# Patient Record
Sex: FEMALE | Race: WHITE | Employment: FULL TIME | URBAN - METROPOLITAN AREA
[De-identification: names, ages, dates, MRNs, and addresses within clinical notes are randomized per-mention and may not be internally consistent; named-entity substitution may affect disease eponyms.]

---

## 2021-06-02 ENCOUNTER — HOSPITAL ENCOUNTER (OUTPATIENT)
Dept: PHYSICAL THERAPY | Age: 31
Discharge: HOME | End: 2021-06-02
Payer: COMMERCIAL

## 2021-06-02 PROCEDURE — 97110 THERAPEUTIC EXERCISES: CPT | Performed by: PHYSICAL THERAPIST

## 2021-06-02 PROCEDURE — 97161 PT EVAL LOW COMPLEX 20 MIN: CPT | Performed by: PHYSICAL THERAPIST

## 2021-06-02 NOTE — THERAPY EVALUATION
Amanda Willard  : 1990  Primary: [facility]*  Secondary:  Therapy Center at [address] [place] [address], [address], [facility]  [phone]   [phone]       OUTPATIENT PHYSICAL THERAPY:Initial Assessment 2021   ICD-10: Treatment Diagnosis: (M54.5) LBP; (M62.81) muscle weakness;  (R 26.2) Difficulty walking   Precautions/Allergies:   Patient has no known allergies. TREATMENT PLAN:  Effective Dates: 2021 TO 2021 (90 days). Frequency/Duration: 1-2 times a week for 90 Day(s)     MEDICAL/REFERRING DIAGNOSIS:  Pain in right hip [M25.551]  Pain in left hip [M25.552]  Encounter for supervision of normal pregnancy, unspecified, unspecified trimester [Z34.90]   DATE OF ONSET: ; exacerbated with pregnancy  REFERRING PHYSICIAN: Aretha Polk MD MD Orders: Evaluate and Treat  Return MD Appointment: none scheduled      INITIAL ASSESSMENT:  Ms. Brian Vaughn presents with central and L side LBP from mechanical dysfunction, including R anterior innominate rotation and L side lumbar rotational fault. She reports pain of 8/10 and scored a 28% disability rating on the modified oswestry. She reports she had pain prior to pregnancy, but it has worsened the further along she's gotten. She is 33 weeks pregnant today. Neutral alignment was achieved with muslce energy techniques (MET) today. Patient was educated on supine-sit transfers and car transfers on protecting her back. Pt will benefit from skilled PT to address the deficits listed below. PROBLEM LIST (Impacting functional limitations):  1. Decreased Strength  2. Decreased Transfer Abilities  3. Decreased Ambulation Ability/Technique  4. Increased Pain  5. Decreased Knowledge of Precautions  6. Decreased [place] with Home Exercise Program INTERVENTIONS PLANNED: (Treatment may consist of any combination of the following)  1. Bed Mobility  2. Cold  3. Heat  4. Home Exercise Program (HEP)  5.  Manual Therapy  6. Neuromuscular Re-education/Strengthening  7. Range of Motion (ROM)  8. Therapeutic Activites  9. Therapeutic Exercise/Strengthening  10. Kinesiotaping   11. Dry Needling   TREATMENT PLAN:  GOALS: (Goals have been discussed and agreed upon with patient.)  Short-Term Functional Goals: Time Frame: 4-6 weeks (7-14-21)  1. Pt will be compliant and independent with HEP, log rolling and demonstrating correct, erect posture without an increase in pain. 2.   Pt will improve Oswestry score to 12/50 to increase pt's overall functional mobility. 3.   Pt to subjectively report a decrease in pain to 6/10 @ worst to increase pt's sitting, standing and walking tolerance. 4.   Pt will improve Lumbar AROM to 50% throughout with manageable pain to increase pt's overall functional mobility. 5.   Pt will maintain neutral pelvic and lumbar alignment ~ 50% of the time, indicating improved core, pelvic floor and hip strength. Discharge Goals: Time Frame: 8-12 weeks (8-31-21)  1. Pt will improve Oswestry score to <10/50 to increase pt's overall functional mobility. 2.   Pt will subjectively report a decrease in pain to 5/10 @ worst to allow pt to ambulate with a normal gait pattern and increase their overall functional mobility. 3.   Pt will subjectively report a decrease in frequency (1-2x/night)  of waking due to back pain. 4.   Pt will maintain neutral pelvic and lumbar alignment ~ 75% of the time, indicating improved core and B hip strength to 3+/5.   5.   Pt will be discharged from PT to [facility]. OUTCOME MEASURE:   Tool Used: Modified Oswestry Low Back Pain Questionnaire  Score:  Initial: 14/50 =28% disability  Most Recent: X/50 (Date: -- )   Interpretation of Score: Each section is scored on a 0-5 scale, 5 representing the greatest disability. The scores of each section are added together for a total score of 50.         MEDICAL NECESSITY:   · Patient is expected to demonstrate progress in strength, range of motion, balance, coordination and functional technique to improve pelvic/lumbar alignment and posture to decrease pain and increase pt's overall functional mobility. .    Total Duration: 20 minutes initial assessment. PT Patient Time In/Time Out  Time In: 1035  Time Out: 1120    Rehabilitation Potential For Stated Goals: Good  Regarding [employer] therapy, I certify that the treatment plan above will be carried out by a therapist or under their direction. Thank you for this referral,  Ingrid Linda, PT     Referring Physician Signature: Eliz Hawkins MD _______________________________ Date _____________     PAIN/SUBJECTIVE:   Initial: Pain Intensity 1: 8  Pain Location 1: Back, Hip  Pain Orientation 1: Left, Right  Post Session:  7/10   HISTORY:   History of Injury/Illness (Reason for Referral):  Patient reports a hx of mechanical back pain prior to pregnancy. She states it has worsened as her pregnancy has progressed. She takes Tylenol to manage pain, and would like to have less pain and improved strength for delivery. Past Medical History/Comorbidities:   Ms. Beryle Spates  has no past medical history on file. Ms. Beryle Spates  has no past surgical history on file. Social History/Living Environment:     Patient lives in a 1 story home with her spouse. 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 Dominant Side:         RIGHT   Ambulatory/Rehab Services H2 Model Falls Risk Assessment   Risk Factors:       No Risk Factors Identified Ability to Rise from Chair:       (0)  Ability to rise in a single movement   Falls Prevention Plan:       No modifications necessary   Total: (5 or greater = High Risk): 0   ©2010 [facility]. All Rights Reserved. [facility] States Patent #[id number].  Federal Law prohibits the replication, distribution or use without written permission from [employer]   Current Medications:     No current outpatient medications on file. Date Last Reviewed:  6/2/2021     Number of Personal Factors/Comorbidities that affect the Plan of Care: 0: LOW COMPLEXITY   EXAMINATION:   Observation/Orthostatic Postural Assessment:          Patient stands with higher R iliac crest compared to the L. Patient with increased WB on L. Palpation:          Patient moderately to severely tender with palpation to B SI joints and B piriformis, L > R. Posture/Deformity:  Lumbar AROM: % of normal motion in standing    Lumbar spine Date:  6/2/21 Date:   Date:     Direction  Parameters Parameters Parameters   Flexion  25% w/ pain     Extension  20% w/ pain     Rotation  25% B w/ pain     Side bending  25% B w/ pain     Hip WFL B       Strength Date:  6/2/21 Date:   Date:     Core/LE 3-/5 Parameters Parameters   Hip Flex R:3/5  L:3/5     Hip Ext R:3-/5  L:3-/5     Hip ABD R:3-/5  L:3-/5     Hip ADD R:3+/5  L:3+/5     Knee Ext R:4/5  L:4/5     Knee Flex R:3+/5  L:3+/5       Myotomes: Normal and equal B throughout  Lower Abdominals (L1):  Iliopsoas (L2):  Quadriceps (L3):  Anterior Tibialis (L4):  Extensor Hallicus Longus (L5):  Gastrocnemius (S1):    Sensation: Normal and equal B throughout  Anterior Thigh (L3):  Medial Foot (L4):  Dorsal Foot (L5):  Lateral Foot (S1):    Special Test/Function:  Pelvic Obliquity: +; R anterior innominate rotation  Rotational Fault: +; L side lumbar rotational fault  Accessory Mobility: hypermobile throughout  SI Compression:+ painful       Body Structures Involved:  1. Nerves  2. Bones  3. Joints  4. Muscles  5. Ligaments Body Functions Affected:  1. Sensory/Pain  2. Neuromusculoskeletal  3. Movement Related Activities and Participation Affected:  1. Mobility  2. Self Care  3. Domestic Life  4. Interpersonal Interactions and Relationships  5.  Community, Social and [place]   Number of elements (examined above) that affect the Plan of Care: 1-2: LOW COMPLEXITY   CLINICAL PRESENTATION:   Presentation: Stable and uncomplicated: LOW COMPLEXITY   CLINICAL DECISION MAKING:   Use of outcome tool(s) and clinical judgement create a POC that gives a: Clear prediction of patient's progress: LOW COMPLEXITY

## 2021-06-02 NOTE — PROGRESS NOTES
Elaine Book  : 1990  [facility]: [facility]*  Secondary:  Therapy Center at [facility]  [address] , [address], [place] [address]  [phone]   [phone]      OUTPATIENT PHYSICAL THERAPY: Daily Treatment Note 2021  ICD-10: Treatment Diagnosis: (M54.5) LBP; (M62.81) muscle weakness;  (R 26.2) Difficulty walking   Precautions/Allergies:   Patient has no known allergies. TREATMENT PLAN:  Effective Dates: 2021 TO 2021 (90 days). Frequency/Duration: 1-2 times a week for 90 Day(s)     MEDICAL/REFERRING DIAGNOSIS:  Pain in right hip [M25.551]  Pain in left hip [M25.552]  Encounter for supervision of normal pregnancy, unspecified, unspecified trimester [Z34.90]   DATE OF ONSET: ; exacerbated with pregnancy  REFERRING PHYSICIAN: Keanu eCrna MD MD Orders: Evaluate and Treat  Return MD Appointment: none scheduled        Pre-treatment Symptoms/Complaints:  Patient is 8 months pregnant and c/o central and L sided back pain, weakness and immobility. Pain: Initial: Pain Intensity 1: 8  Pain Location 1: Back, Hip  Pain Orientation 1: Left, Right  Post Session:  7/10   Medications Last Reviewed:  2021    Updated Objective Findings:  See evaluation note from today and R anterior innominate rotation and L side lumbar rotational fault. TREATMENT:   THERAPEUTIC EXERCISE: (25 minutes):  Exercises per grid below to improve mobility and strength. Required moderate visual, verbal and tactile cues to promote proper body alignment, promote proper body posture, promote proper body mechanics and promote proper body breathing techniques. Progressed resistance, range, repetitions and complexity of movement as indicated.      Date:  21 Date:   Date:     Activity/Exercise Parameters Parameters Parameters   Muscle Energy Technique (MET) R HS/L quads x 8 reps x 2 rounds     LTR w/ OP to L 20 x 2 rounds     TAs 10x     TAs w/ add sq 10x     Bridging w/ TAs and add sq 10x      Hip ABD Lime TB ;10x     DKTC/Happy Baby Pose 2 x 2min                             MANUAL THERAPY: (0 minutes): Joint mobilization and Soft tissue mobilization was utilized and necessary because of the patient's restricted joint motion and restricted motion of soft tissue. [employer] Portal  Access Code: [email or web address]  URL: [email or web address]  Date: 06/02/2021  Prepared by: Jennifer Brittle    Exercises  Supine Transversus Abdominis Bracing with Pelvic Floor Contraction - 1 x daily - 7 x weekly - 10 reps - 3 sets  Supine Hip Adduction Isometric with Ball - 1 x daily - 7 x weekly - 10 reps - 3 sets  Supine Bridge with Mini Swiss Ball Between Knees - 1 x daily - 7 x weekly - 10 reps - 3 sets  Hooklying Clamshell with Resistance - 1 x daily - 7 x weekly - 10 reps - 3 sets  Supine Lower Trunk Rotation - 1 x daily - 7 x weekly - 10 reps - 3 sets  Hooklying Single Knee to Chest Stretch - 2 x daily - 7 x weekly - 10 reps - 1 sets    Treatment/Session Summary:    · Response to Treatment:  Patient with neutral alignment after MET. Patient did well with above exercises, therefore I added those to her HEP. [employer] · Communication/Consultation:  HEP 2x daily, log rolling, transfers, etc.   · Equipment provided today:  HEP w/ instruction sheet; Lime TB; ice  · Recommendations/Intent for next treatment session: Next visit will focus on advancements to more challenging core and B hip strengthening exercises after pelvic/lumbar assessment/correction.   · Variance to POC: None    Total Treatment Billable Duration:  25 min therex; see initial assessment   PT Patient Time In/Time Out  Time In: 1035  Time Out: 5215 Fidelina Pettit, PT    Future Appointments   Date Time Provider Shameka Espino   6/7/2021 10:30 AM Katlyn Salinas PT [facility]   6/10/2021 10:30 AM Katlyn Salinas PT [facility]   6/14/2021 10:30 AM Katlyn Salinas PT [facility]   6/16/2021  2:00 PM Yoseph Brewer, PT [facility]   6/21/2021 11:00 AM Nette Barba, PT [facility]   6/24/2021 11:00 AM Nette Barba, PT [facility]   6/28/2021 10:30 AM Yoseph Brewer, PT [facility]   7/1/2021 10:30 AM Dora Morrow, PT [facility]

## 2021-06-07 ENCOUNTER — HOSPITAL ENCOUNTER (OUTPATIENT)
Dept: PHYSICAL THERAPY | Age: 31
Discharge: HOME | End: 2021-06-07
Payer: COMMERCIAL

## 2021-06-07 PROCEDURE — 97110 THERAPEUTIC EXERCISES: CPT | Performed by: PHYSICAL THERAPIST

## 2021-06-07 PROCEDURE — 97140 MANUAL THERAPY 1/> REGIONS: CPT | Performed by: PHYSICAL THERAPIST

## 2021-06-07 NOTE — PROGRESS NOTES
Radha Jaci  : 1990  Primary: [facility]*  Secondary:  Therapy Center at [address] [place] [address], [address], [place] [address]  [phone]   [phone]      OUTPATIENT PHYSICAL THERAPY: Daily Treatment Note 2021  ICD-10: Treatment Diagnosis: (M54.5) LBP; (M62.81) muscle weakness;  (R 26.2) Difficulty walking   Precautions/Allergies:   Patient has no known allergies. TREATMENT PLAN:  Effective Dates: 2021 TO 2021 (90 days). Frequency/Duration: 1-2 times a week for 90 Day(s)     MEDICAL/REFERRING DIAGNOSIS:  Pain in right hip [M25.551]  Pain in left hip [M25.552]  Encounter for supervision of normal pregnancy, unspecified, unspecified trimester [Z34.90]   DATE OF ONSET: ; exacerbated with pregnancy  REFERRING PHYSICIAN: Mickey Johnson MD MD Orders: Evaluate and Treat  Return MD Appointment: none scheduled        Pre-treatment Symptoms/Complaints:  Patient reports soreness after last PT visit. She states she feels she's striking harder on her R side today when she walks. Pain: Initial: Pain Intensity 1: 7  Pain Location 1: Back, Hip  Pain Orientation 1: Left  Post Session:  5/10   Medications Last Reviewed:  2021    Updated Objective Findings:  L anterior innominate rotation and R side lumbar rotational fault. TREATMENT:   THERAPEUTIC EXERCISE: (35 minutes):  Exercises per grid below to improve mobility and strength. Required moderate visual, verbal and tactile cues to promote proper body alignment, promote proper body posture, promote proper body mechanics and promote proper body breathing techniques. Progressed resistance, range, repetitions and complexity of movement as indicated.      Date:  21 Date:  21 Date:     Activity/Exercise Parameters Parameters Parameters   Muscle Energy Technique (MET) R HS/L quads x 8 reps x 2 rounds L HS/ R quads x 8 reps x 2 rounds    LTR w/ OP to L 20 x 2 rounds To R; 20 x 2 rounds TAs 10x     TAs w/ add sq 10x 15x    Bridging w/ TAs and add sq 10x  10x    Hip ABD Lime TB ;10x Man Resist; 15x    DKTC/Happy Baby Pose 2 x 2min 3 x 2min                      Recumbent Stepper  L1; 10min    MANUAL THERAPY: (15 minutes): Joint mobilization and Soft tissue mobilization was utilized and necessary because of the patient's restricted joint motion and restricted motion of soft tissue. STM/MFR to B posterior hips, manually and with stick,  including trigger point release with elbow to piriformis and glute medius, L > R.     [employer] Portal  Access Code: [email or web address]  URL: [email or web address]  Date: 06/02/2021  Prepared by: Kimmy Glynn    Exercises  Supine Transversus Abdominis Bracing with Pelvic Floor Contraction - 1 x daily - 7 x weekly - 10 reps - 3 sets  Supine Hip Adduction Isometric with Ball - 1 x daily - 7 x weekly - 10 reps - 3 sets  Supine Bridge with Mini Swiss Ball Between Knees - 1 x daily - 7 x weekly - 10 reps - 3 sets  Hooklying Clamshell with Resistance - 1 x daily - 7 x weekly - 10 reps - 3 sets  Supine Lower Trunk Rotation - 1 x daily - 7 x weekly - 10 reps - 3 sets  Hooklying Single Knee to Chest Stretch - 2 x daily - 7 x weekly - 10 reps - 1 sets    Treatment/Session Summary:    · Response to Treatment:  Patient with neutral alignment after MET. She was able to tolerate trigger point release B. Improved posture and gait after treatment. .  · Communication/Consultation:  HEP 2x daily, log rolling, transfers, etc.   · Equipment provided today:   ice  · Recommendations/Intent for next treatment session: Next visit will focus on advancements to more challenging core and B hip strengthening exercises after pelvic/lumbar assessment/correction.   · Variance to POC: None    Total Treatment Billable Duration:  50 minutes ( 35 min therex; 15 min manual)  PT Patient Time In/Time Out  Time In: 1030  Time Out: 1675 Fidelina Pettit, PT    Future Appointments   Date Time Provider Shameka Kenzie   6/10/2021  7:00 PM Dulcie Morning, PT [facility]   6/14/2021 10:30 AM Dulcie Morning, PT [facility]   6/16/2021  7:00 PM Dulcie Morning, PT [facility]   6/21/2021 11:00 AM Brianna Barba, PT [facility]   6/24/2021  7:00 PM Skyler Barba, PT [facility]   6/28/2021 10:30 AM Dulcie Morning, PT [facility]   7/1/2021  7:00 PM Dulcie Morning, PT [facility]

## 2021-06-10 ENCOUNTER — HOSPITAL ENCOUNTER (OUTPATIENT)
Dept: PHYSICAL THERAPY | Age: 31
Discharge: HOME | End: 2021-06-10
Payer: COMMERCIAL

## 2021-06-10 NOTE — PROGRESS NOTES
Ady Kang  : 1990  Primary: [facility]*  Secondary:  [facility], [address], [place] [address]  [phone]   [phone]          OUTPATIENT DAILY NOTE    NAME/AGE/GENDER: Ady Kang is a 26 y.o. female. DATE: 6/10/2021    Ms. Stephany Mirza for today's appointment due to only being able to attend on  due to work schedule. Cheo Reynoso, PT      Future Appointments   Date Time Provider Shameka Espino   6/10/2021  7:00 PM Marcelina Prado, PT [facility]   2021 10:30 AM [facility] Pr[facility], [facility]   2021  7:00 PM Marcelina Prado, PT [facility]   2021 11:00 AM Brittney Barba, PT [facility]   2021  7:00 PM Skyler Barba, PT [facility]   2021 10:30 AM Marcelina Prado, PT [facility]   2021  7:00 PM Marcel[facility] Prado, [facility]

## 2021-06-14 ENCOUNTER — HOSPITAL ENCOUNTER (OUTPATIENT)
Dept: PHYSICAL THERAPY | Age: 31
Discharge: HOME | End: 2021-06-14
Payer: COMMERCIAL

## 2021-06-14 NOTE — PROGRESS NOTES
Vivian Resendiz  : 1990  Primary: [facility]*  Secondary:  Therapy Center at [place] [address], [address], [place] [address]  [phone]   [phone]        OUTPATIENT DAILY NOTE    NAME/AGE/GENDER: Vivian Resendiz is a 26 y.o. female. DATE: 2021    Ms. Rubenzuhair Saxena for today's appointment due to conflict.      Marielena Woodson, PT      Future Appointments   Date Time Provider Shameka Espino   2021  7:00 PM [facility], PT [facility]   2021  7:00 PM Candy Jaime, PT [facility]   2021  4:00 PM Nette Barba, PT [facility]   2021 11:00 AM Nette Barba, PT [facility]   2021  7:00 PM Nette Barba, PT [facility]   2021 10:30 AM Candy Jaime, PT [facility]   2021  7:00 PM Candy Jaime, PT [facility]

## 2021-06-16 ENCOUNTER — HOSPITAL ENCOUNTER (OUTPATIENT)
Dept: PHYSICAL THERAPY | Age: 31
Discharge: HOME | End: 2021-06-16
Payer: COMMERCIAL

## 2021-06-16 NOTE — PROGRESS NOTES
Jayshree Rivas  : 1990  Primary: [facility]*  Secondary:  Therapy Center at [facility]  [address], [address], [place] [address]  [phone]   [phone]        OUTPATIENT DAILY NOTE    NAME/AGE/GENDER: Jayshree Rivas is a 26 y.o. female. DATE: 2021    Ms. Beatriz Alvares for today's appointment due to conflict. Stephanie Kebede, PT      Future Appointments   Date Time Provider Shameka Michaeli   2021  7:00 PM Columbuslv Del Rosario, PT [facility]   2021  4:00 PM [facility] Br[facility]   2021 11:00 AM Nette Barba, [facility]   2021  7:00 PM Nette Barba, PT [facility]   2021 10:30 AM [place] Colace, PT [facility]   2021  7:00 PM Ashleigh Del Rosario, PT [facility]

## 2021-06-17 ENCOUNTER — HOSPITAL ENCOUNTER (OUTPATIENT)
Dept: PHYSICAL THERAPY | Age: 31
Discharge: HOME | End: 2021-06-17
Payer: COMMERCIAL

## 2021-06-17 PROCEDURE — 97140 MANUAL THERAPY 1/> REGIONS: CPT | Performed by: PHYSICAL THERAPIST

## 2021-06-17 PROCEDURE — 97110 THERAPEUTIC EXERCISES: CPT | Performed by: PHYSICAL THERAPIST

## 2021-06-17 NOTE — PROGRESS NOTES
Ella Schneider  : 1990  Primary: [facility]*  Secondary:  [facility] at [facility], [address], [place] [address]  [phone]   [phone]      OUTPATIENT PHYSICAL THERAPY: Daily Treatment Note 2021  ICD-10: Treatment Diagnosis: (M54.5) LBP; (M62.81) muscle weakness;  (R 26.2) Difficulty walking   Precautions/Allergies:   Patient has no known allergies. TREATMENT PLAN:  Effective Dates: 2021 TO 2021 (90 days). Frequency/Duration: 1-2 times a week for 90 Day(s)     MEDICAL/REFERRING DIAGNOSIS:  Pain in right hip [M25.551]  Pain in left hip [M25.552]  Encounter for supervision of normal pregnancy, unspecified, unspecified trimester [Z34.90]   DATE OF ONSET: ; exacerbated with pregnancy  REFERRING PHYSICIAN: Sabrina Willis MD MD Orders: Evaluate and Treat  Return MD Appointment: none scheduled        Pre-treatment Symptoms/Complaints:  Patient reports less hip tightness after last PT visit. She reports baby is growing rapidly, and likely coming early. Pain: Initial: Pain Intensity 1: 5  Pain Location 1: Back, Hip  Pain Orientation 1: Left  Post Session:  2-3/10   Medications Last Reviewed:  2021    Updated Objective Findings:  L anterior innominate rotation and L side lumbar rotational fault. TREATMENT:   THERAPEUTIC EXERCISE: (35 minutes):  Exercises per grid below to improve mobility and strength. Required moderate visual, verbal and tactile cues to promote proper body alignment, promote proper body posture, promote proper body mechanics and promote proper body breathing techniques. Progressed resistance, range, repetitions and complexity of movement as indicated.      Date:  21 Date:  21 Date:  21   Activity/Exercise Parameters Parameters Parameters   Muscle Energy Technique (MET) R HS/L quads x 8 reps x 2 rounds L HS/ R quads x 8 reps x 2 rounds L HS/R quads x 8 reps x 2 rounds   LTR w/ OP to L 20 x 2 rounds To R; 20 x 2 rounds To L; 20 x 2 rounds   TAs 10x     TAs w/ add sq 10x 15x Man Resist; 15x   Bridging w/ TAs and add sq 10x  10x 5x   Hip ABD Lime TB ;10x Man Resist; 15x Man Resist; 15x   DKTC/Happy Baby Pose 2 x 2min 3 x 2min    Seated forward flexion and side w/large red ball   10x each direction   S/L hip ABD   GTB; 20x B   S/L clams   GTB; 20x   Recumbent Stepper  L1; 10min L1.5; 10min   MANUAL THERAPY: (10 minutes): Joint mobilization and Soft tissue mobilization was utilized and necessary because of the patient's restricted joint motion and restricted motion of soft tissue. STM/MFR in sidelying to B posterior hips, manually and with stick,  including trigger point release with elbow to piriformis and glute medius, L > R.     [employer] Portal  Access Code: [email or web address]  URL: [email or web address]  Date: 06/02/2021  Prepared by: Angela Alvarado    Exercises  Supine Transversus Abdominis Bracing with Pelvic Floor Contraction - 1 x daily - 7 x weekly - 10 reps - 3 sets  Supine Hip Adduction Isometric with Ball - 1 x daily - 7 x weekly - 10 reps - 3 sets  Supine Bridge with Mini Swiss Ball Between Knees - 1 x daily - 7 x weekly - 10 reps - 3 sets  Hooklying Clamshell with Resistance - 1 x daily - 7 x weekly - 10 reps - 3 sets  Supine Lower Trunk Rotation - 1 x daily - 7 x weekly - 10 reps - 3 sets  Hooklying Single Knee to Chest Stretch - 2 x daily - 7 x weekly - 10 reps - 1 sets    Treatment/Session Summary:    · Response to Treatment:  Neutral alignment achieved easily with MET. She is tolerating fewer exercises on her back due to growing baby. She was able to tolerate aggressive manual therapy with improved pain/tigthness/gait after treatment.  .  · Communication/Consultation:  HEP 2x daily, log rolling, transfers, etc.   · Equipment provided today:   ice  · Recommendations/Intent for next treatment session: Next visit will focus on advancements to more challenging core and B hip strengthening exercises after pelvic/lumbar assessment/correction.   · Variance to POC: None    Total Treatment Billable Duration:  45 minutes ( 35 min therex; 10 min manual)  PT Patient Time In/Time Out  Time In: 1605  Time Out: 946 Ubaldo Abarca PT    Future Appointments   Date Time Provider Shameka Espino   6/21/2021 11:00 AM Maria D Lugo, PT [facility]   6/24/2021  7:00 PM Maria D Lugo, PT [facility]   6/28/2021 10:30 AM Carlie Kwon, PT [facility]   7/1/2021  7:00 PM Carlie Kwon, PT [facility]

## 2021-06-21 ENCOUNTER — HOSPITAL ENCOUNTER (OUTPATIENT)
Dept: PHYSICAL THERAPY | Age: 31
Discharge: HOME | End: 2021-06-21
Payer: COMMERCIAL

## 2021-06-21 PROCEDURE — 97140 MANUAL THERAPY 1/> REGIONS: CPT

## 2021-06-21 PROCEDURE — 97110 THERAPEUTIC EXERCISES: CPT

## 2021-06-21 NOTE — PROGRESS NOTES
Kj Johnson  : 1990  Primary: [employer]  Secondary:  [facility] at [facility]  [address] , [address], [place] [address]  [phone]   [phone]      OUTPATIENT PHYSICAL THERAPY: Daily Treatment Note 2021  ICD-10: Treatment Diagnosis: (M54.5) LBP; (M62.81) muscle weakness;  (R 26.2) Difficulty walking   Precautions/Allergies:   Patient has no known allergies. TREATMENT PLAN:  Effective Dates: 2021 TO 2021 (90 days). Frequency/Duration: 1-2 times a week for 90 Day(s)     MEDICAL/REFERRING DIAGNOSIS:  Pain in right hip [M25.551]  Pain in left hip [M25.552]  Encounter for supervision of normal pregnancy, unspecified, unspecified trimester [Z34.90]   DATE OF ONSET: ; exacerbated with pregnancy  REFERRING PHYSICIAN: Randy Evans MD MD Orders: Evaluate and Treat  Return MD Appointment: none scheduled        Pre-treatment Symptoms/Complaints:  Patient reports left/low back soreness. Some difficulty reported with supine position, and notes that she is no longer having issue with placenta previa  Pain: Initial: Pain Intensity 1: 4  Post Session:  2-3/10   Medications Last Reviewed:  2021    Updated Objective Findings:  None Today   TREATMENT:   THERAPEUTIC EXERCISE: (35 minutes):  Exercises per grid below to improve mobility and strength. Required moderate visual, verbal and tactile cues to promote proper body alignment, promote proper body posture, promote proper body mechanics and promote proper body breathing techniques. Progressed resistance, range, repetitions and complexity of movement as indicated.      Date:  21 Date:  21 Date:  21 Date:  21   Activity/Exercise Parameters Parameters Parameters    Muscle Energy Technique (MET) R HS/L quads x 8 reps x 2 rounds L HS/ R quads x 8 reps x 2 rounds L HS/R quads x 8 reps x 2 rounds shotgun seated; instructed in for HEP   LTR w/ OP to L 20 x 2 rounds To R; 20 x 2 rounds To L; 20 x 2 rounds    TAs 10x      TAs w/ add sq 10x 15x Man Resist; 15x    Bridging w/ TAs and add sq 10x  10x 5x 3 x 5 with wedge   Hip ABD Lime TB ;10x Man Resist; 15x Man Resist; 15x Standing 2 x 5; B   DKTC/Happy Baby Pose 2 x 2min 3 x 2min     Seated forward flexion and side w/large red ball   10x each direction 10x each direction   S/L hip ABD   GTB; 20x B    S/L clams   GTB; 20x 2 x 10 w 2 sec hold   Recumbent Stepper  L1; 10min L1.5; 10min L1.5; 10min   Wall ball squats    2 x 10 1/4 squat   MANUAL THERAPY: (10 minutes): Joint mobilization and Soft tissue mobilization was utilized and necessary because of the patient's restricted joint motion and restricted motion of soft tissue. STM/MFR in sidelying to B posterior hips, manually and with stick,  including trigger point release with elbow to piriformis and glute medius, L > R.     [employer] Portal  Access Code: [email or web address]  URL: [email or web address]  Date: 06/02/2021  Prepared by: Mateus Manual    Exercises  Supine Transversus Abdominis Bracing with Pelvic Floor Contraction - 1 x daily - 7 x weekly - 10 reps - 3 sets  Supine Hip Adduction Isometric with Ball - 1 x daily - 7 x weekly - 10 reps - 3 sets  Supine Bridge with Mini Swiss Ball Between Knees - 1 x daily - 7 x weekly - 10 reps - 3 sets  Hooklying Clamshell with Resistance - 1 x daily - 7 x weekly - 10 reps - 3 sets  Supine Lower Trunk Rotation - 1 x daily - 7 x weekly - 10 reps - 3 sets  Hooklying Single Knee to Chest Stretch - 2 x daily - 7 x weekly - 10 reps - 1 sets    Treatment/Session Summary:    · Response to Treatment:  Tolerated seated and stadnigna activities. Jose cooper for breathing coordiantion and technique.   · Communication/Consultation:  HEP  · Equipment provided today:   ice  · Recommendations/Intent for next treatment session: Next visit will focus on advancements to more challenging core and B hip strengthening exercises after pelvic/lumbar assessment/correction.   · Variance to POC: None    Total Treatment Billable Duration:  45 minutes ( 35 min therex; 10 min manual)  PT Patient Time In/Time Out  Time In: 1100  Time Out: Myesha Becerra, PT    Future Appointments   Date Time Provider Shameka Espino   6/24/2021  7:00 PM NORMA Israel   6/28/2021 10:30 AM NORMA Heath   7/1/2021  7:00 PM Onel John [facility]

## 2021-06-24 ENCOUNTER — HOSPITAL ENCOUNTER (OUTPATIENT)
Dept: PHYSICAL THERAPY | Age: 31
Discharge: HOME | End: 2021-06-24
Payer: COMMERCIAL

## 2021-06-24 NOTE — PROGRESS NOTES
Fito Dorman  : 1990  Primary: [employer]  Secondary:  [facility] [place] [address], [address], [place] [address]  [phone]   [phone]      OUTPATIENT DAILY NOTE    NAME/AGE/GENDER: Fito Dorman is a 26 y.o. female. DATE: 2021    Ms. Anastacio Acevedo for today's appointment due to scheduling; only able to come on .     Iris Toney PT   Future Appointments   Date Time Provider Shameka Espino   2021  7:00 PM Chivo Teran   2021 10:30 AM NORMA Moran   [facility]

## 2021-06-28 ENCOUNTER — HOSPITAL ENCOUNTER (OUTPATIENT)
Dept: PHYSICAL THERAPY | Age: 31
Discharge: HOME | End: 2021-06-28
Payer: COMMERCIAL

## 2021-06-28 PROCEDURE — 97140 MANUAL THERAPY 1/> REGIONS: CPT | Performed by: PHYSICAL THERAPIST

## 2021-06-28 PROCEDURE — 97110 THERAPEUTIC EXERCISES: CPT | Performed by: PHYSICAL THERAPIST

## 2021-06-28 NOTE — PROGRESS NOTES
Krystal Rollins  : 1990  Primary: [employer]*  Secondary:  [facility] at [facility]  [address] , [address], [place] [address]  [phone]   [phone]      OUTPATIENT PHYSICAL THERAPY: Daily Treatment Note 2021  ICD-10: Treatment Diagnosis: (M54.5) LBP; (M62.81) muscle weakness;  (R 26.2) Difficulty walking   Precautions/Allergies:   Patient has no known allergies. TREATMENT PLAN:  Effective Dates: 2021 TO 2021 (90 days). Frequency/Duration: 1-2 times a week for 90 Day(s)     MEDICAL/REFERRING DIAGNOSIS:  Pain in right hip [M25.551]  Pain in left hip [M25.552]  Encounter for supervision of normal pregnancy, unspecified, unspecified trimester [Z34.90]   DATE OF ONSET: ; exacerbated with pregnancy  REFERRING PHYSICIAN: Tutu Luke MD MD Orders: Evaluate and Treat  Return MD Appointment: none scheduled        Pre-treatment Symptoms/Complaints:  Patient reports she has roughly 18 days before delivery. She reports more back pain over the weekend due to her photo shoot. Pain: Initial: Pain Intensity 1: 4  Pain Location 1: Back, Hip  Pain Orientation 1: Left  Post Session:  -3/10   Medications Last Reviewed:  2021    Updated Objective Findings:  L anterior innominate rotation and L side lumbar rotational fault. TREATMENT:   THERAPEUTIC EXERCISE: (35 minutes):  Exercises per grid below to improve mobility and strength. Required moderate visual, verbal and tactile cues to promote proper body alignment, promote proper body posture, promote proper body mechanics and promote proper body breathing techniques. Progressed resistance, range, repetitions and complexity of movement as indicated.      Date:  21 Date:  21 Date:  21 Date:  21 Date:  21   Activity/Exercise Parameters Parameters Parameters     Muscle Energy Technique (MET) R HS/L quads x 8 reps x 2 rounds L HS/ R quads x 8 reps x 2 rounds L HS/R quads x 8 reps x 2 rounds shotgun seated; instructed in for HEP L HS/R quads x 8 reps x 2 rounds   LTR w/ OP to L 20 x 2 rounds To R; 20 x 2 rounds To L; 20 x 2 rounds  To L; 20 x 2 rounds   TAs 10x       TAs w/ add sq 10x 15x Man Resist; 15x  Man resist; 10x   Bridging w/ TAs and add sq 10x  10x 5x 3 x 5 with wedge    Hip ABD Lime TB ;10x Man Resist; 15x Man Resist; 15x Standing 2 x 5; B Man resist; 10x   DKTC/Happy Baby Pose 2 x 2min 3 x 2min      Seated forward flexion and side w/large red ball   10x each direction 10x each direction    S/L hip ABD   GTB; 20x B     S/L clams   GTB; 20x 2 x 10 w 2 sec hold    Recumbent Stepper  L1; 10min L1.5; 10min L1.5; 10min L2; 10min   Resisted side stepping     Lime TB; thighs; 50' x 2   Resisted diagonals     Lime TB; thighs; 50' x 2   Wall ball squats    2 x 10 1/4 squat    MANUAL THERAPY: (10 minutes): Joint mobilization and Soft tissue mobilization was utilized and necessary because of the patient's restricted joint motion and restricted motion of soft tissue. STM/MFR in sidelying to B posterior hips, manually and with stick,  including trigger point release with elbow to piriformis and glute medius, L > R.     [employer] Portal  Access Code: [email or web address]  URL: [email or web address]  Date: 06/02/2021  Prepared by: Wu Hernandez    Exercises  Supine Transversus Abdominis Bracing with Pelvic Floor Contraction - 1 x daily - 7 x weekly - 10 reps - 3 sets  Supine Hip Adduction Isometric with Ball - 1 x daily - 7 x weekly - 10 reps - 3 sets  Supine Bridge with Mini Swiss Ball Between Knees - 1 x daily - 7 x weekly - 10 reps - 3 sets  Hooklying Clamshell with Resistance - 1 x daily - 7 x weekly - 10 reps - 3 sets  Supine Lower Trunk Rotation - 1 x daily - 7 x weekly - 10 reps - 3 sets  Hooklying Single Knee to Chest Stretch - 2 x daily - 7 x weekly - 10 reps - 1 sets    Treatment/Session Summary:    · Response to Treatment:  Neutral alignment achieved with MET.  She did well with addition of hip strengthening with no increase in pain. Icreased posterior hip tightness remains, but improves with manual therapy. .  · Communication/Consultation:  HEP  · Equipment provided today:   ice  · Recommendations/Intent for next treatment session: Next visit will focus on advancements to more challenging core and B hip strengthening exercises after pelvic/lumbar assessment/correction.   · Variance to POC: None    Total Treatment Billable Duration:  45 minutes ( 35 min therex; 10 min manual)  PT Patient Time In/Time Out  Time In: 1030  Time Out: [address], PT    Future Appointments   Date Time Provider Shameka Espino   7/1/2021  7:00 PM Od[place], [address] [facility]

## 2021-07-01 ENCOUNTER — HOSPITAL ENCOUNTER (OUTPATIENT)
Dept: PHYSICAL THERAPY | Age: 31
Discharge: HOME | End: 2021-07-01

## 2021-07-01 NOTE — PROGRESS NOTES
An Vaughan  : 1990  Primary: [employer]*  Secondary:  [facility] at [place] [address], [address], [place] [address]  [phone]   [phone]          OUTPATIENT DAILY NOTE    NAME/AGE/GENDER: An Vaughan is a 26 y.o. female. DATE: 2021    Ms. Dimas Ulrich for today's appointment due to only being able to scheule on Monday's due to work. Juanell Leyland Cullen Kanner, PT        Future Appointments   Date Time Provider Shameka Espino   2021  7:00 PM Oksana Aggarwal PT [facility]

## 2021-10-07 NOTE — THERAPY DISCHARGE
Fito Dorman  : 1990  Primary: [employer]*  [facility]  [facility] [address], [place] [address]  [phone]   [phone]       OUTPATIENT PHYSICAL THERAPY:Discontinuation Summary 2021   ICD-10: Treatment Diagnosis: (M54.5) LBP; (M62.81) muscle weakness;  (R 26.2) Difficulty walking   Precautions/Allergies:   Patient has no known allergies. TREATMENT PLAN:  Effective Dates: 2021 TO 2021 (90 days). Frequency/Duration: 1-2 times a week for 90 Day(s)     MEDICAL/REFERRING DIAGNOSIS:  Pain in right hip [M25.551]  Pain in left hip [M25.552]  Encounter for supervision of normal pregnancy, unspecified, unspecified trimester [Z34.90]   DATE OF ONSET: ; exacerbated with pregnancy  REFERRING PHYSICIAN: Lucina Stacy MD MD Orders: Evaluate and Treat  Return MD Appointment: none scheduled      Fito Dorman has been seen in physical therapy from 21 to 21 for 5 visits. Treatment has been discontinued at this time due to Expiration of plan of care without further referral by ordering physician, patient delivering baby and patient failing to return for additional treatment. The below goals were met prior to discontinuation. Some goals were not met due to patient discontinuing PT early. Patient is DC'd from PT to HEP at this time. Thank you for this referral.        PROBLEM LIST (Impacting functional limitations):  1. Decreased Strength  2. Decreased Transfer Abilities  3. Decreased Ambulation Ability/Technique  4. Increased Pain  5. Decreased Knowledge of Precautions  6. Decreased [place] with Home Exercise Program INTERVENTIONS PLANNED: (Treatment may consist of any combination of the following)  1. Bed Mobility  2. Cold  3. Heat  4. Home Exercise Program (HEP)  5. Manual Therapy  6. Neuromuscular Re-education/Strengthening  7. Range of Motion (ROM)  8. Therapeutic Activites  9.  Therapeutic Exercise/Strengthening  10. Kinesiotaping   11. Dry Needling   TREATMENT PLAN:  GOALS: (Goals have been discussed and agreed upon with patient.)  Short-Term Functional Goals: Time Frame: 4-6 weeks (7-14-21)  1. Pt will be compliant and independent with HEP, log rolling and demonstrating correct, erect posture without an increase in pain.-----------------MET  2. Pt will improve Oswestry score to 12/50 to increase pt's overall functional mobility.-------------------------------------------NOT ASSESSED  3. Pt to subjectively report a decrease in pain to 6/10 @ worst to increase pt's sitting, standing and walking tolerance. -----------------------MET  4. Pt will improve Lumbar AROM to 50% throughout with manageable pain to increase pt's overall functional mobility. -----------------------------MET  5. Pt will maintain neutral pelvic and lumbar alignment ~ 50% of the time, indicating improved core, pelvic floor and hip strength. ----------------------NOT MET  Discharge Goals: Time Frame: 8-12 weeks (8-31-21)  1. Pt will improve Oswestry score to <10/50 to increase pt's overall functional mobility.-------------------------------------NOT ASSESSED  2. Pt will subjectively report a decrease in pain to 5/10 @ worst to allow pt to ambulate with a normal gait pattern and increase their overall functional mobility.---------NOT MET  3. Pt will subjectively report a decrease in frequency (1-2x/night)  of waking due to back pain.-------------------------------------------NOT MET  4. Pt will maintain neutral pelvic and lumbar alignment ~ 75% of the time, indicating improved core and B hip strength to 3+/5. ---------------------------------NOT MET  5.    Pt will be discharged from PT to HEP.-------------------------------------------------------------------------------------------------------------------------------------------------------MET Summer Mares, PT

## 2025-01-23 ENCOUNTER — TRANSCRIBE ORDERS (OUTPATIENT)
Dept: SCHEDULING | Age: 35
End: 2025-01-23

## 2025-01-23 DIAGNOSIS — Z98.82 BREAST IMPLANT STATUS: ICD-10-CM

## 2025-01-23 DIAGNOSIS — N63.10 MASS OF RIGHT BREAST, UNSPECIFIED QUADRANT: Primary | ICD-10-CM

## 2025-02-10 ENCOUNTER — HOSPITAL ENCOUNTER (OUTPATIENT)
Dept: MAMMOGRAPHY | Age: 35
Discharge: HOME | End: 2025-02-13
Attending: OBSTETRICS & GYNECOLOGY
Payer: COMMERCIAL

## 2025-02-10 VITALS — WEIGHT: 220 LBS | HEIGHT: 59 IN | BODY MASS INDEX: 44.35 KG/M2

## 2025-02-10 DIAGNOSIS — N63.10 MASS OF RIGHT BREAST, UNSPECIFIED QUADRANT: ICD-10-CM

## 2025-02-10 DIAGNOSIS — Z98.82 BREAST IMPLANT STATUS: ICD-10-CM

## 2025-02-10 PROCEDURE — 76642 ULTRASOUND BREAST LIMITED: CPT

## 2025-02-10 PROCEDURE — G0279 TOMOSYNTHESIS, MAMMO: HCPCS

## 2025-02-13 ENCOUNTER — HOSPITAL ENCOUNTER (OUTPATIENT)
Dept: MAMMOGRAPHY | Age: 35
Discharge: HOME | End: 2025-02-16
Payer: COMMERCIAL

## 2025-02-13 DIAGNOSIS — R92.8 ABNORMAL ULTRASOUND OF BREAST: ICD-10-CM

## 2025-02-13 PROCEDURE — 88305 TISSUE EXAM BY PATHOLOGIST: CPT

## 2025-02-13 PROCEDURE — 6360000002 HC RX W HCPCS: Performed by: OBSTETRICS & GYNECOLOGY

## 2025-02-13 PROCEDURE — 2709999900 US BREAST BIOPSY W LOC DEVICE 1ST LESION RIGHT

## 2025-02-13 PROCEDURE — 77065 DX MAMMO INCL CAD UNI: CPT

## 2025-02-13 RX ORDER — LIDOCAINE HYDROCHLORIDE AND EPINEPHRINE 10; 10 MG/ML; UG/ML
5 INJECTION, SOLUTION INFILTRATION; PERINEURAL ONCE
Status: COMPLETED | OUTPATIENT
Start: 2025-02-13 | End: 2025-02-13

## 2025-02-13 RX ADMIN — LIDOCAINE HYDROCHLORIDE,EPINEPHRINE BITARTRATE 5 ML: 10; .01 INJECTION, SOLUTION INFILTRATION; PERINEURAL at 09:45
